# Patient Record
Sex: MALE | Race: WHITE | Employment: FULL TIME | URBAN - METROPOLITAN AREA
[De-identification: names, ages, dates, MRNs, and addresses within clinical notes are randomized per-mention and may not be internally consistent; named-entity substitution may affect disease eponyms.]

---

## 2022-08-01 ENCOUNTER — OFFICE VISIT (OUTPATIENT)
Dept: PRIMARY CARE CLINIC | Age: 22
End: 2022-08-01
Payer: COMMERCIAL

## 2022-08-01 VITALS
WEIGHT: 201 LBS | OXYGEN SATURATION: 99 % | DIASTOLIC BLOOD PRESSURE: 80 MMHG | SYSTOLIC BLOOD PRESSURE: 120 MMHG | HEART RATE: 82 BPM | BODY MASS INDEX: 27.22 KG/M2 | HEIGHT: 72 IN | TEMPERATURE: 98.2 F | RESPIRATION RATE: 16 BRPM

## 2022-08-01 DIAGNOSIS — R05.3 CHRONIC COUGH: ICD-10-CM

## 2022-08-01 DIAGNOSIS — E01.0 THYROMEGALY: ICD-10-CM

## 2022-08-01 DIAGNOSIS — Z00.00 ANNUAL PHYSICAL EXAM: Primary | ICD-10-CM

## 2022-08-01 PROCEDURE — 99385 PREV VISIT NEW AGE 18-39: CPT | Performed by: FAMILY MEDICINE

## 2022-08-01 RX ORDER — CETIRIZINE HYDROCHLORIDE 10 MG/1
10 TABLET ORAL DAILY
Qty: 30 TABLET | Refills: 1 | Status: SHIPPED | OUTPATIENT
Start: 2022-08-01 | End: 2022-08-31

## 2022-08-01 SDOH — ECONOMIC STABILITY: FOOD INSECURITY: WITHIN THE PAST 12 MONTHS, YOU WORRIED THAT YOUR FOOD WOULD RUN OUT BEFORE YOU GOT MONEY TO BUY MORE.: NEVER TRUE

## 2022-08-01 SDOH — ECONOMIC STABILITY: FOOD INSECURITY: WITHIN THE PAST 12 MONTHS, THE FOOD YOU BOUGHT JUST DIDN'T LAST AND YOU DIDN'T HAVE MONEY TO GET MORE.: NEVER TRUE

## 2022-08-01 ASSESSMENT — ENCOUNTER SYMPTOMS
SHORTNESS OF BREATH: 0
WHEEZING: 0
COUGH: 1
SORE THROAT: 0
ABDOMINAL PAIN: 0
DIARRHEA: 0
VOMITING: 0
NAUSEA: 0
BLOOD IN STOOL: 0

## 2022-08-01 ASSESSMENT — PATIENT HEALTH QUESTIONNAIRE - PHQ9
2. FEELING DOWN, DEPRESSED OR HOPELESS: 0
SUM OF ALL RESPONSES TO PHQ QUESTIONS 1-9: 1
SUM OF ALL RESPONSES TO PHQ QUESTIONS 1-9: 1
SUM OF ALL RESPONSES TO PHQ9 QUESTIONS 1 & 2: 1
1. LITTLE INTEREST OR PLEASURE IN DOING THINGS: 1
SUM OF ALL RESPONSES TO PHQ QUESTIONS 1-9: 1
SUM OF ALL RESPONSES TO PHQ QUESTIONS 1-9: 1

## 2022-08-01 ASSESSMENT — SOCIAL DETERMINANTS OF HEALTH (SDOH): HOW HARD IS IT FOR YOU TO PAY FOR THE VERY BASICS LIKE FOOD, HOUSING, MEDICAL CARE, AND HEATING?: NOT HARD AT ALL

## 2022-08-01 NOTE — PROGRESS NOTES
Chief Complaint   Patient presents with    New Patient     NP to establish         Felisha Brock is a 23 y.o. male who presents for yearly physical    Pt denies any hx of asthma, HTN or diabetes    Surgical hx includes B/L knee ACL repairs and L thumb surgery    Pt is a nonsmoker and uses alcohol 2 drinks twice a week and denies any illegal drug use; Pt is a college graduate and majored in [address] [place] [address] Isadora did one COVID vaccine shot    FHx positive for diabetes (PGM) and early CAD (MGF) and brain aneurysm (PGF and mother)       Review of Systems   Constitutional:  Negative for fatigue and fever. HENT:  Positive for sneezing. Negative for congestion, nosebleeds and sore throat. Eyes:         Negative blurred vision or diplopia   Respiratory:  Positive for cough (dry daily episodes (chronic)). Negative for shortness of breath and wheezing. Cardiovascular:  Negative for chest pain, palpitations and leg swelling. Gastrointestinal:  Negative for abdominal pain, blood in stool, diarrhea, nausea and vomiting. Negative melena or indigestion   Endocrine: Negative for polydipsia and polyuria. Genitourinary:  Negative for dysuria and hematuria. Musculoskeletal:  Negative for arthralgias. Skin:  Negative for rash. Neurological:  Positive for headaches (at times). Negative for dizziness, seizures, speech difficulty and weakness. Psychiatric/Behavioral:  Negative for dysphoric mood. The patient is not nervous/anxious. Vitals:    08/01/22 1510   BP: 120/80   Pulse: 82   Resp: 16   Temp: 98.2 °F (36.8 °C)   SpO2: 99%         Physical Exam  Vitals reviewed. Constitutional:       General: He is not in acute distress. HENT:      Nose: Nose normal.      Mouth/Throat:      Mouth: Mucous membranes are moist.      Pharynx: Oropharynx is clear. Eyes:      General: No scleral icterus. Comments: Pink conjunctivae    Neck:      Thyroid: Thyromegaly (mild) present.       Comments: No carotid bruits noted B/L  Cardiovascular:      Heart sounds: Normal heart sounds. No murmur heard. No friction rub. No gallop. Pulmonary:      Effort: Pulmonary effort is normal.      Breath sounds: Normal breath sounds. Comments: But distant sounds  Abdominal:      Palpations: Abdomen is soft. Tenderness: There is no abdominal tenderness. Genitourinary:     Penis: Normal.       Comments: No testicular masses or hernias noted; No inguinal adenopathy noted B/L; No CVA tendernous to percussion noted B/L  Musculoskeletal:      Comments: No leg edema noted B/L; nontender vertebral spine to palpation   Lymphadenopathy:      Cervical: No cervical adenopathy. Skin:     Findings: No rash. Neurological:      Mental Status: He is alert. Comments: Cranial nerves 2 - 12 grossly intact; Muscle strength 5/5 throughout   Psychiatric:         Mood and Affect: Mood normal.       ASSESSMENT AND PLAN    1. Annual physical exam  Pt is with stable VS and an unremarkable exam other than noted thyromegaly and will check fasting bloodwork  - CBC with Auto Differential; Future  - Comprehensive Metabolic Panel; Future  - TSH with Reflex; Future  - Lipid Panel; Future  - Urinalysis with Reflex to Culture; Future    2. Thyromegaly  Will check thyroid US and TFT bloodwork for further evaluation  - US THYROID; Future    3. Chronic cough  Pt admits to sneezing but denies any associated indigestion or SOB and most likely is secondary to allergies and will start pt on a daily antihistamine and reevaluate pt in 6 weeks. Will also check a CXR for further evaluation    - cetirizine (ZYRTEC) 10 MG tablet; Take 1 tablet by mouth in the morning. Dispense: 30 tablet; Refill: 1  - XR CHEST (2 VW); Future      Richelle Kayser, MD      Return in about 6 weeks (around 9/12/2022).